# Patient Record
Sex: MALE | Race: BLACK OR AFRICAN AMERICAN | HISPANIC OR LATINO | Employment: STUDENT | URBAN - METROPOLITAN AREA
[De-identification: names, ages, dates, MRNs, and addresses within clinical notes are randomized per-mention and may not be internally consistent; named-entity substitution may affect disease eponyms.]

---
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
## 2019-01-23 DIAGNOSIS — J45.41 MODERATE PERSISTENT ASTHMA WITH EXACERBATION: ICD-10-CM

## 2019-01-23 DIAGNOSIS — L20.82 FLEXURAL ECZEMA: ICD-10-CM

## 2019-01-23 RX ORDER — MINERAL OIL/UREA/PEG/WATER
LOTION (ML) TOPICAL
Qty: 226 G | Refills: 0 | OUTPATIENT
Start: 2019-01-23

## 2019-01-23 NOTE — TELEPHONE ENCOUNTER
Benadryl cream, Eucerin cream, and Benadryl liquid    Last Written Prescription Date:  6-27-17, 1-11-19,  11-10-18  Last Fill Quantity: 300ml, 226g, 28g   # refills: 5, 0, 0  Last Office Visit: 8-21-18  Future Office visit:       Routing refill requests to provider for review/approval because:  Per Pediatric refill protocol.    Please advise, thanks.    (Eucerin cream is a duplicate and verified this with pharmacy.  Med refused.)

## 2019-01-24 RX ORDER — DIPHENHYDRAMINE HCL 12.5 MG/5ML
50 SOLUTION ORAL EVERY 4 HOURS PRN
Qty: 300 ML | Refills: 5 | Status: SHIPPED | OUTPATIENT
Start: 2019-01-24 | End: 2023-03-02

## 2019-01-24 RX ORDER — KRILL/OM-3/DHA/EPA/PHOSPHO/AST 1000-230MG
CAPSULE ORAL
Qty: 28 G | Refills: 0 | Status: SHIPPED | OUTPATIENT
Start: 2019-01-24 | End: 2020-03-30

## 2019-01-26 DIAGNOSIS — L20.82 FLEXURAL ECZEMA: ICD-10-CM

## 2019-01-26 NOTE — TELEPHONE ENCOUNTER
Clobetasol 0.05 % cream      Last Written Prescription Date:  1/11/19  Last Fill Quantity: 30 g,   # refills: 0  Last Office Visit: 8/21/18  Future Office visit:       Routing refill request to provider for review/approval because:  Peds protocol.  Requested Prescriptions   Pending Prescriptions Disp Refills     CLOBETASOL PROPIONATE EMULSION 0.05 % CREA cream [Pharmacy Med Name: CLOBETASOL 0.05% EMOLLIENT CRM 15GM] 30 g 0     Sig: APPLY SPARINGLY TWICE DAILY FOR 1-2 WEEKS AS NEEDED. 1-2 WEEKS OFF PRIOR TO FURTHER USE    There is no refill protocol information for this order        Agnes Prater RN

## 2019-01-28 DIAGNOSIS — L20.82 FLEXURAL ECZEMA: ICD-10-CM

## 2019-01-28 RX ORDER — KRILL/OM-3/DHA/EPA/PHOSPHO/AST 1000-230MG
CAPSULE ORAL
Qty: 28 G | Refills: 0 | OUTPATIENT
Start: 2019-01-28

## 2019-01-29 RX ORDER — CLOBETASOL PROPIONATE 0.5 MG/G
EMULSION TOPICAL
Qty: 45 G | Refills: 0 | Status: SHIPPED | OUTPATIENT
Start: 2019-01-29 | End: 2019-02-25

## 2019-02-25 DIAGNOSIS — L20.82 FLEXURAL ECZEMA: ICD-10-CM

## 2019-02-25 NOTE — TELEPHONE ENCOUNTER
Clobetasol Propionate       Last Written Prescription Date:  1/29/19  Last Fill Quantity: 45 g,   # refills: 0  Last Office Visit: 8/21/18  Future Office visit:       Routing refill request to provider for review/approval because:  Per pediatric protocol

## 2019-03-01 RX ORDER — CLOBETASOL PROPIONATE 0.5 MG/G
EMULSION TOPICAL
Qty: 45 G | Refills: 0 | Status: SHIPPED | OUTPATIENT
Start: 2019-03-01 | End: 2019-03-13

## 2019-03-24 DIAGNOSIS — L20.82 FLEXURAL ECZEMA: ICD-10-CM

## 2019-03-25 NOTE — TELEPHONE ENCOUNTER
DiphenhydrAMINE-zinc acetate (BENADRYL) cream     Last Written Prescription Date:  3/14/19  Last Fill Quantity: 28g,   # refills: 0  Last Office Visit: 12/21/18  Future Office visit:       Routing refill request to provider for review/approval because:  Per pediatric protocol.

## 2019-03-26 RX ORDER — DIPHENHYDRAMINE HYDROCHLORIDE, ZINC ACETATE 2; .1 G/100G; G/100G
CREAM TOPICAL
Qty: 28 G | Refills: 2 | Status: SHIPPED | OUTPATIENT
Start: 2019-03-26 | End: 2019-06-02

## 2019-05-18 DIAGNOSIS — L20.82 FLEXURAL ECZEMA: ICD-10-CM

## 2019-05-18 NOTE — TELEPHONE ENCOUNTER
diphenhydrAMINE-zinc acetate (BENADRYL) 2-0.1 % external cream  Last Written Prescription Date:  3/26/19  Last Fill Quantity: 28 g,   # refills: 2  Last Office Visit: 8/21/18  Future Office visit:       Routing refill request to provider for review/approval because:  Peds protocol  Requested Prescriptions   Pending Prescriptions Disp Refills     diphenhydrAMINE-zinc acetate (BENADRYL) 2-0.1 % external cream [Pharmacy Med Name: ANTI-ITCH CREAM MAX STRENGTH 28GM] 28 g 0     Sig: APPLY EXTERNALLY TO THE AFFECTED AREA THREE TIMES DAILY AS NEEDED FOR ITCHING       There is no refill protocol information for this order

## 2019-05-21 RX ORDER — DIPHENHYDRAMINE HYDROCHLORIDE, ZINC ACETATE 2; .1 G/100G; G/100G
CREAM TOPICAL
Qty: 28 G | Refills: 0 | Status: SHIPPED | OUTPATIENT
Start: 2019-05-21 | End: 2019-06-16

## 2019-06-02 DIAGNOSIS — L20.82 FLEXURAL ECZEMA: ICD-10-CM

## 2019-06-02 NOTE — TELEPHONE ENCOUNTER
Requested Prescriptions   Pending Prescriptions Disp Refills     Emollient (EUCERIN SKIN CALMING) CREA [Pharmacy Med Name: EUCERIN CALMING CREAM 226ML]  Last Written Prescription Date:  7/17/18  Last Fill Quantity: 226g,  # refills: 0   Last office visit: 8/21/2018 with prescribing provider:  Adelaida   Future Office Visit:    226 g 0     Sig: APPLY TOPICALLY AS NEEDED FOR DRY SKIN    There is no refill protocol information for this order      Routing refill request to provider for review/approval because:  Pediatric patient         
none

## 2019-06-04 RX ORDER — KRILL/OM-3/DHA/EPA/PHOSPHO/AST 1000-230MG
CAPSULE ORAL
Qty: 28 G | Refills: 0 | Status: SHIPPED | OUTPATIENT
Start: 2019-06-04 | End: 2020-03-30

## 2019-06-04 NOTE — TELEPHONE ENCOUNTER
Wal-Dryl (BENADRYL) 2-0.1 % external cream     Last Written Prescription Date:  5/12/19  Last Fill Quantity: 28g,   # refills: 0  Last Office Visit: 12/21/18  Future Office visit:       Routing refill request to provider for review/approval because:  Per pediatric protocol.

## 2019-06-16 DIAGNOSIS — L20.82 FLEXURAL ECZEMA: ICD-10-CM

## 2019-06-17 NOTE — TELEPHONE ENCOUNTER
WAL-DRYL 2-0.1 % external cream      Last Written Prescription Date:  6/4/19  Last Fill Quantity: 28g,   # refills: 0  Last Office Visit: 8/21/18  Future Office visit:       Routing refill request to provider for review/approval because:  Per pediatric protocol.

## 2019-06-18 RX ORDER — KRILL/OM-3/DHA/EPA/PHOSPHO/AST 1000-230MG
CAPSULE ORAL
Qty: 28 G | Refills: 0 | Status: SHIPPED | OUTPATIENT
Start: 2019-06-18 | End: 2019-07-06

## 2019-06-24 DIAGNOSIS — J45.50 UNCOMPLICATED SEVERE PERSISTENT ASTHMA (H): ICD-10-CM

## 2019-06-24 NOTE — TELEPHONE ENCOUNTER
"Requested Prescriptions   Pending Prescriptions Disp Refills     ipratropium - albuterol 0.5 mg/2.5 mg/3 mL (DUONEB) 0.5-2.5 (3) MG/3ML neb solution 30 vial 1     Sig: Take 1 vial (3 mLs) by nebulization every 6 hours as needed for shortness of breath / dyspnea or wheezing   Last Written Prescription Date:  10/28/2016  Last Fill Quantity: 30 vial,  # refills: 1   Last office visit: 12/21/2018 with prescribing provider:     Future Office Visit:      Short-Acting Beta Agonist Inhalers Protocol  Failed - 6/24/2019 12:50 PM        Failed - Asthma control assessment score within normal limits in last 6 months     Please review ACT score.           Failed - Recent (6 mo) or future (30 days) visit within the authorizing provider's specialty     Patient had office visit in the last 6 months or has a visit in the next 30 days with authorizing provider or within the authorizing provider's specialty.  See \"Patient Info\" tab in inbasket, or \"Choose Columns\" in Meds & Orders section of the refill encounter.            Passed - Patient is age 12 or older        Passed - Medication is active on med list        "

## 2019-06-25 RX ORDER — IPRATROPIUM BROMIDE AND ALBUTEROL SULFATE 2.5; .5 MG/3ML; MG/3ML
1 SOLUTION RESPIRATORY (INHALATION) EVERY 6 HOURS PRN
Qty: 30 VIAL | Refills: 1 | Status: SHIPPED | OUTPATIENT
Start: 2019-06-25 | End: 2023-03-02

## 2019-06-25 NOTE — TELEPHONE ENCOUNTER
Ipratorpium-albuterol neb  Last Written Prescription Date:  10/28/16  Last Fill Quantity: 30 vials,   # refills: 1  Last Office Visit: 8/21/19  Future Office visit:       Routing refill request to provider for review/approval because:  Per pediatric protocol

## 2019-07-06 DIAGNOSIS — L20.82 FLEXURAL ECZEMA: ICD-10-CM

## 2019-07-06 NOTE — TELEPHONE ENCOUNTER
Wal-dryl cream      Last Written Prescription Date:  6-18-19  Last Fill Quantity: 28g,   # refills: 0  Last Office Visit: 12-21-18  Future Office visit:       Routing refill request to provider for review/approval because:  Per Pediatric refill protocol.    Please advise, thanks.

## 2019-07-10 RX ORDER — KRILL/OM-3/DHA/EPA/PHOSPHO/AST 1000-230MG
CAPSULE ORAL
Qty: 28 G | Refills: 0 | Status: SHIPPED | OUTPATIENT
Start: 2019-07-10 | End: 2020-06-02

## 2019-07-23 ENCOUNTER — TELEPHONE (OUTPATIENT)
Dept: PEDIATRICS | Facility: CLINIC | Age: 19
End: 2019-07-23

## 2019-08-01 ENCOUNTER — TRANSFERRED RECORDS (OUTPATIENT)
Dept: HEALTH INFORMATION MANAGEMENT | Facility: CLINIC | Age: 19
End: 2019-08-01

## 2019-08-01 LAB
ASTHMA CONTROL TEST SCORE: 22
ER ASTHMA: 0
HOSPITALIZATION ASTHMA: 0

## 2019-08-22 NOTE — TELEPHONE ENCOUNTER
Discussed the form further.  Mother stating that he needs his own room due to being able to go there when agitated, get away from others.  Also if does move would have to go through process of checking for mold and mitigation due to his severe asthma. Discussed with mom that would like to get some information from mental health professional indicating benefit to him important to be able to fill out form.  Will hold onto form for time being.

## 2019-09-02 DIAGNOSIS — L20.82 FLEXURAL ECZEMA: ICD-10-CM

## 2019-09-03 RX ORDER — CLOBETASOL PROPIONATE 0.5 MG/G
EMULSION TOPICAL
Qty: 45 G | Refills: 0 | Status: SHIPPED | OUTPATIENT
Start: 2019-09-03 | End: 2019-12-03

## 2019-09-03 NOTE — TELEPHONE ENCOUNTER
Clobetasol cream      Last Written Prescription Date:  3/14/19  Last Fill Quantity: 45 g,   # refills: 0  Last Office Visit: 12/21/18  Future Office visit:       Routing refill request to provider for review/approval because:  Drug not on the [facility], [facility] or [facility] refill protocol or controlled substance  Haley Engle RN

## 2019-10-08 ENCOUNTER — TRANSFERRED RECORDS (OUTPATIENT)
Dept: HEALTH INFORMATION MANAGEMENT | Facility: CLINIC | Age: 19
End: 2019-10-08

## 2019-10-08 LAB
CREAT SERPL-MCNC: 1.08 MG/DL (ref 0.72–1.25)
GFR SERPL CREATININE-BSD FRML MDRD: >60 ML/MIN/1.73M2
GLUCOSE SERPL-MCNC: 96 MG/DL (ref 65–100)
POTASSIUM SERPL-SCNC: 3.8 MMOL/L (ref 3.5–5)

## 2019-10-14 ENCOUNTER — TRANSFERRED RECORDS (OUTPATIENT)
Dept: HEALTH INFORMATION MANAGEMENT | Facility: CLINIC | Age: 19
End: 2019-10-14

## 2019-10-16 ENCOUNTER — PATIENT OUTREACH (OUTPATIENT)
Dept: CARE COORDINATION | Facility: CLINIC | Age: 19
End: 2019-10-16

## 2019-10-16 DIAGNOSIS — Z76.89 HEALTH CARE HOME: Primary | ICD-10-CM

## 2019-10-16 NOTE — PROGRESS NOTES
The clinic Community Health Worker called the patient today at the request of the PCP to discuss possible Clinic Care Coordination enrollment.  The service was described to the patient and immediate needs were discussed.  The patient declined enrollement at this time.  The PCP is encouraged to refer in the future if the patient's needs change.    Spoke to Lauren(mom)

## 2019-12-03 DIAGNOSIS — L20.82 FLEXURAL ECZEMA: ICD-10-CM

## 2019-12-03 RX ORDER — CLOBETASOL PROPIONATE 0.5 MG/G
CREAM TOPICAL
Qty: 45 G | Refills: 0 | Status: SHIPPED | OUTPATIENT
Start: 2019-12-03 | End: 2020-03-30

## 2019-12-03 NOTE — TELEPHONE ENCOUNTER
Requested Prescriptions   Pending Prescriptions Disp Refills     clobetasol (CLOBETASOL PROPIONATE EMULSION) 0.05 % CREA cream 45 g 0       There is no refill protocol information for this order          Last Written Prescription Date:  09/03/19  Last Fill Quantity: 45 g,  # refills: 0   Last office visit: 8/21/2018 with prescribing provider:  12/21/18   Future Office Visit:

## 2019-12-18 NOTE — TELEPHONE ENCOUNTER
I had asked the mother from some documentation from a mental health professional about the need for his own room.   At this point have not heard back from them and will close encounter.

## 2020-02-06 ENCOUNTER — TRANSFERRED RECORDS (OUTPATIENT)
Dept: HEALTH INFORMATION MANAGEMENT | Facility: CLINIC | Age: 20
End: 2020-02-06

## 2020-02-06 LAB
ASTHMA CONTROL TEST SCORE: 16
ER ASTHMA: 1
HOSPITALIZATION ASTHMA: 0

## 2020-03-24 ENCOUNTER — TRANSFERRED RECORDS (OUTPATIENT)
Dept: HEALTH INFORMATION MANAGEMENT | Facility: CLINIC | Age: 20
End: 2020-03-24

## 2020-03-24 LAB
ASTHMA CONTROL TEST SCORE: 19
ER ASTHMA: 0
HOSPITALIZATION ASTHMA: 0

## 2020-03-26 ENCOUNTER — TRANSFERRED RECORDS (OUTPATIENT)
Dept: HEALTH INFORMATION MANAGEMENT | Facility: CLINIC | Age: 20
End: 2020-03-26

## 2020-03-30 ENCOUNTER — VIRTUAL VISIT (OUTPATIENT)
Dept: INTERNAL MEDICINE | Facility: CLINIC | Age: 20
End: 2020-03-30
Payer: COMMERCIAL

## 2020-03-30 DIAGNOSIS — L20.82 FLEXURAL ECZEMA: ICD-10-CM

## 2020-03-30 DIAGNOSIS — L70.0 ACNE VULGARIS: ICD-10-CM

## 2020-03-30 PROCEDURE — 99442 ZZC PHYSICIAN TELEPHONE EVALUATION 11-20 MIN: CPT | Performed by: INTERNAL MEDICINE

## 2020-03-30 RX ORDER — PIMECROLIMUS 10 MG/G
CREAM TOPICAL
Qty: 45 G | Refills: 2 | Status: SHIPPED | OUTPATIENT
Start: 2020-03-30 | End: 2023-03-02

## 2020-03-30 RX ORDER — CLOBETASOL PROPIONATE 0.5 MG/G
CREAM TOPICAL
Qty: 45 G | Refills: 3 | Status: SHIPPED | OUTPATIENT
Start: 2020-03-30 | End: 2023-03-02

## 2020-03-30 NOTE — PROGRESS NOTES
"Subjective     Lázaro Colindres is a 19 year old male who is being evaluated via a billable telephone visit.      The patient has been notified of following:     \"This telephone visit will be conducted via a call between you and your physician/provider. We have found that certain health care needs can be provided without the need for a physical exam.  This service lets us provide the care you need with a short phone conversation.  If a prescription is necessary we can send it directly to your pharmacy.  If lab work is needed we can place an order for that and you can then stop by our lab to have the test done at a later time.    If during the course of the call the physician/provider feels a telephone visit is not appropriate, you will not be charged for this service.\"     Patient has given verbal consent for Telephone visit?  Yes    Lázaro Colindres complains of   Chief Complaint   Patient presents with     Recheck Medication     Telephone visit- F/U on Eczema/refills       ALLERGIES  Peanuts [nuts]; Dog hair [dogs]; Dust mites; Mold; Seasonal allergies; Soy; and Wheat    Patient had a phone visit for refills of his topical medications for acne and eczema.   He has been seen in the pediatric clinic up to now.     Has h/o acne vulgaris. Managed with topical benzoyl peroxide treatment. Reports good control and no significant side effects, no skin dryness. No itching.   Has h/o eczema, uses as needed steroid topical treatment with good effect. Uses skin moisturizers.          PROBLEMS TO ADD ON...  -------------------------------------  Has h/o asthma , seeing specialist. On inhaled bronchodilators and steroids. Controlled .    Patient Active Problem List   Diagnosis     Allergic rhinitis     Obesity     Severe persistent asthma beginning 9/2016 on continuous steroid treatment via Kenalog injections.      Constipation     Generalized abdominal pain     Eosinophilic esophagitis     Tic disorder     Eczema     " Irritable bowel syndrome     Severe persistent asthma without complication in pediatric patient     Flexural eczema     Class 3 obesity due to excess calories with body mass index (BMI) of 45.0 to 49.9 in adult, unspecified whether serious comorbidity present     Past Surgical History:   Procedure Laterality Date     COLONOSCOPY       ESOPHAGOSCOPY, GASTROSCOPY, DUODENOSCOPY (EGD), COMBINED  3/4/2013    Procedure: COMBINED ESOPHAGOSCOPY, GASTROSCOPY, DUODENOSCOPY (EGD), BIOPSY SINGLE OR MULTIPLE;  Upper Endoscopy With Biopsy; Colonoscopy With Biopsy ;  Surgeon: Georgiana York MD;  Location:  OR       Social History     Tobacco Use     Smoking status: Never Smoker     Smokeless tobacco: Never Used     Tobacco comment: No one in family smokes.   Substance Use Topics     Alcohol use: No     Alcohol/week: 0.0 standard drinks     Comment: 04/07/2015 l.n.     Family History   Problem Relation Age of Onset     Asthma Mother      Arthritis Mother      Diabetes Mother      Diabetes Maternal Grandmother      Arthritis Maternal Grandmother      C.A.D. Brother      C.A.D. Sister      Diabetes Father      Diabetes Maternal Grandfather      Asthma Sister         Both sisters have asthma.      Eczema Sister         Sisters and brothers all have eczema.     Allergies Brother         Sisters and brothers all have food allergies.      Diabetes Paternal Grandmother      Diabetes Paternal Uncle          Current Outpatient Medications   Medication Sig Dispense Refill     albuterol (2.5 MG/3ML) 0.083% neb solution Take 1 vial (2.5 mg) by nebulization every 4 hours as needed 2 Box 1     albuterol (PROAIR HFA/PROVENTIL HFA/VENTOLIN HFA) 108 (90 BASE) MCG/ACT Inhaler Inhale 2 puffs into the lungs every 4 hours as needed for shortness of breath / dyspnea or wheezing 2 Inhaler 0     Benzoyl Peroxide 2.5 % CREA Apply BID 1 Tube 5     calcium carbonate-vitamin D 500-400 MG-UNIT TABS per tablet Take 1 tablet by mouth daily 90 tablet 3      cetirizine (ZYRTEC) 10 MG tablet Take 1 tablet (10 mg) by mouth At Bedtime 30 tablet 6     clobetasol (CLOBETASOL PROPIONATE EMULSION) 0.05 % CREA cream APPLY SPARINGLY TWICE DAILY FOR 1-2 WEEKS AS NEEDED. TAKE 1-2 WEEKS OFF PRIOR TO FURTHER USE 45 g 3     diphenhydrAMINE (Q-DRYL) 12.5 MG/5ML liquid Take 20 mLs (50 mg) by mouth every 4 hours as needed for itching or allergies 300 mL 5     Emollient (EUCERIN SKIN CALMING) CREA APPLY TOPICALLY AS NEEDED FOR DRY SKIN 226 g 0     EPINEPHrine (EPIPEN) 0.3 MG/0.3ML injection Inject 0.3 mLs (0.3 mg) into the muscle as needed for anaphylaxis 0.6 mL 3     fluticasone (FLONASE) 50 MCG/ACT spray Spray 2 sprays into both nostrils daily 16 g 5     fluticasone (FLOVENT HFA) 220 MCG/ACT Inhaler Inhale 2 puffs into the lungs 2 times daily Per asthma action plan 1 Inhaler 12     ipratropium - albuterol 0.5 mg/2.5 mg/3 mL (DUONEB) 0.5-2.5 (3) MG/3ML neb solution Take 1 vial (3 mLs) by nebulization every 6 hours as needed for shortness of breath / dyspnea or wheezing 30 vial 1     Multiple Vitamins-Minerals (CENTROVITE) TABS TAKE 1 TABLET BY MOUTH DAILY 100 tablet 0     olopatadine (PATANOL) 0.1 % ophthalmic solution 1 gtt each eye BID prn 5 mL 12     pimecrolimus (ELIDEL) 1 % external cream Apply 1-2 times per day for several weeks prn to face eczema. 45 g 2     prednisoLONE (ORAPRED) 15 mg/5 mL solution Give 15 mL PO BID x 6 days. 180 mL 0     psyllium (METAMUCIL/KONSYL) 58.6 % powder Take 18 g (1 Tablespoonful) by mouth daily 283 g 3     Triamcinolone Acetonide (KENALOG IJ) Reported on 3/6/2017       umeclidinium-vilanterol (ANORO ELLIPTA) 62.5-25 MCG/INH oral inhaler Inhale 2 Doses into the lungs 2 times daily       WAL-DRYL 2-0.1 % external cream APPLY EXTERNALLY TO THE AFFECTED AREA THREE TIMES DAILY AS NEEDED FOR ITCHING 28 g 0     omalizumab (XOLAIR) 150 MG injection Inject Subcutaneous every 28 days       order for DME Equipment being ordered: Nebulizer tubing x 2 and  adult mask x 2 2 each 0       Reviewed and updated as needed this visit by Provider         Review of Systems   ROS COMP: Constitutional, HEENT, cardiovascular, pulmonary, gi and gu systems are negative, except as otherwise noted.         Diagnostic Test Results:  Labs reviewed in Epic        Assessment/Plan:  1. Flexural eczema    - clobetasol (CLOBETASOL PROPIONATE EMULSION) 0.05 % CREA cream; APPLY SPARINGLY TWICE DAILY FOR 1-2 WEEKS AS NEEDED. TAKE 1-2 WEEKS OFF PRIOR TO FURTHER USE  Dispense: 45 g; Refill: 3  - pimecrolimus (ELIDEL) 1 % external cream; Apply 1-2 times per day for several weeks prn to face eczema.  Dispense: 45 g; Refill: 2    2. Acne vulgaris    - Benzoyl Peroxide 2.5 % CREA; Apply BID  Dispense: 1 Tube; Refill: 5    No follow-ups on file.      Phone call duration:  12 minutes    Dylan Hammer MD

## 2020-03-30 NOTE — TELEPHONE ENCOUNTER
Clobetasol cream      Last Written Prescription Date:  12-3-19  Last Fill Quantity: 45g,   # refills: 0  Last Office Visit: 8-21-18  Future Office visit:       Routing refill request to provider for review/approval because:  Per Pediatric refill protocol.    Schedule telephone visit?  If so, with IM/family practice or Peds?    Please advise, thanks.

## 2020-03-30 NOTE — PROGRESS NOTES
"Subjective     Lázaro Colindres is a 19 year old male who is being evaluated via a billable telephone visit.      The patient has been notified of following:     \"This telephone visit will be conducted via a call between you and your physician/provider. We have found that certain health care needs can be provided without the need for a physical exam.  This service lets us provide the care you need with a short phone conversation.  If a prescription is necessary we can send it directly to your pharmacy.  If lab work is needed we can place an order for that and you can then stop by our lab to have the test done at a later time.    If during the course of the call the physician/provider feels a telephone visit is not appropriate, you will not be charged for this service.\"     Patient has given verbal consent for Telephone visit?  Yes    Lázaro Colindres complains of   Chief Complaint   Patient presents with     Recheck Medication     Telephone visit- Requesting Clobetasol refill       ALLERGIES  Peanuts [nuts]; Dog hair [dogs]; Dust mites; Mold; Seasonal allergies; Soy; and Wheat      {PEDS Chronic and Acute Problems (Optional):314957}     {additonal problems for provider to add (Optional):392722}    {HIST REVIEW/ LINKS 2 (Optional):549873}    Reviewed and updated as needed this visit by Provider         Review of Systems   {ROS COMP (Optional):331015}       Objective   Reported vitals:  There were no vitals taken for this visit.   {GENERAL APPEARANCE:50::\"healthy\",\"alert\",\"no distress\"}  Psych: Alert and oriented times 3; coherent speech, normal   rate and volume, able to articulate logical thoughts, able   to abstract reason, no tangential thoughts, no hallucinations   or delusions  His affect is ***     {Diagnostic Test Results (Optional):400780::\"Diagnostic Test Results:\",\"Labs reviewed in Epic\"}        Assessment/Plan:  {Diagnosis, Associated Orders and Comment:609852}    No follow-ups on file.      Phone call " duration:  *** minutes    {signature options:232553}

## 2020-03-31 ENCOUNTER — TELEPHONE (OUTPATIENT)
Dept: PEDIATRICS | Facility: CLINIC | Age: 20
End: 2020-03-31

## 2020-03-31 DIAGNOSIS — L30.9 ECZEMA, UNSPECIFIED TYPE: Primary | ICD-10-CM

## 2020-03-31 RX ORDER — CLOBETASOL PROPIONATE 0.5 MG/G
CREAM TOPICAL
Qty: 45 G | Refills: 0 | OUTPATIENT
Start: 2020-03-31

## 2020-03-31 NOTE — TELEPHONE ENCOUNTER
Fax received from [employer] stating that the pimecrolimus (ELIDEL) 1 % external cream is not covered under patient plan.  Preferred alternative is Elidel or Protopic.  Please advise.

## 2020-04-20 DIAGNOSIS — E55.9 VITAMIN D DEFICIENCY: ICD-10-CM

## 2020-04-20 NOTE — TELEPHONE ENCOUNTER
Vitamin D      Last Written Prescription Date:  ?  Last Fill Quantity: ?,   # refills: ?  Last Office Visit: 8/21/18  Future Office visit:       Routing refill request to provider for review/approval because:  Drug not active on patient's medication list    This was from back in 2017.  Please advise, thanks.

## 2020-04-21 RX ORDER — CHOLECALCIFEROL (VITAMIN D3) 1250 MCG
CAPSULE ORAL
Qty: 12 CAPSULE | Refills: 0 | Status: SHIPPED | OUTPATIENT
Start: 2020-04-21 | End: 2023-03-02

## 2020-04-21 NOTE — TELEPHONE ENCOUNTER
Chart - last rx couple years ago.  Had Vitamin D 13 2018.  Will give rx.    Please notify of rx.  Please also notify that he as he is 19 years old, will need to move up to IM or FP as I don't prescribe or see people once they are 19 (made exception today due to Covid 19 issues).    He would need to have a lab follow up in few months before considering further refills, so that would be good time around July-August for him to establish with IM.

## 2020-04-23 ENCOUNTER — TRANSFERRED RECORDS (OUTPATIENT)
Dept: HEALTH INFORMATION MANAGEMENT | Facility: CLINIC | Age: 20
End: 2020-04-23

## 2020-05-29 RX ORDER — PIMECROLIMUS 10 MG/G
CREAM TOPICAL 2 TIMES DAILY
Qty: 60 G | Refills: 1 | Status: SHIPPED | OUTPATIENT
Start: 2020-05-29

## 2020-05-31 DIAGNOSIS — L20.82 FLEXURAL ECZEMA: ICD-10-CM

## 2020-06-01 NOTE — TELEPHONE ENCOUNTER
wal-dryl cream      Last Written Prescription Date:  7/10/19  Last Fill Quantity: 28 g,   # refills: 0  Last Office Visit: 8/21/18 with Adelaida.  Patient saw Dr. Hammer in IM on 12/21/18  Future Office visit:       Routing refill request to provider for review/approval because:  Pediatric protocol

## 2020-06-01 NOTE — TELEPHONE ENCOUNTER
I do not do medications for 19 years old.  Does look like he has seen an IM provider for at least one visit.  Please check with parent to see if they have selected an IM provider to see him that they can specify as pcp and redirect the prescription refill to them.

## 2020-06-02 RX ORDER — KRILL/OM-3/DHA/EPA/PHOSPHO/AST 1000-230MG
CAPSULE ORAL
Qty: 28 G | Refills: 0 | Status: SHIPPED | OUTPATIENT
Start: 2020-06-02 | End: 2023-03-02

## 2020-06-13 DIAGNOSIS — L20.82 FLEXURAL ECZEMA: ICD-10-CM

## 2020-06-15 ENCOUNTER — TRANSFERRED RECORDS (OUTPATIENT)
Dept: HEALTH INFORMATION MANAGEMENT | Facility: CLINIC | Age: 20
End: 2020-06-15

## 2020-06-16 RX ORDER — CLOBETASOL PROPIONATE 0.5 MG/G
CREAM TOPICAL
Qty: 45 G | Refills: 3 | OUTPATIENT
Start: 2020-06-16

## 2020-07-27 ENCOUNTER — TRANSFERRED RECORDS (OUTPATIENT)
Dept: HEALTH INFORMATION MANAGEMENT | Facility: CLINIC | Age: 20
End: 2020-07-27

## 2020-08-20 ENCOUNTER — TRANSFERRED RECORDS (OUTPATIENT)
Dept: HEALTH INFORMATION MANAGEMENT | Facility: CLINIC | Age: 20
End: 2020-08-20

## 2020-10-19 ENCOUNTER — TRANSFERRED RECORDS (OUTPATIENT)
Dept: HEALTH INFORMATION MANAGEMENT | Facility: CLINIC | Age: 20
End: 2020-10-19

## 2021-01-14 ENCOUNTER — TRANSFERRED RECORDS (OUTPATIENT)
Dept: HEALTH INFORMATION MANAGEMENT | Facility: CLINIC | Age: 21
End: 2021-01-14

## 2021-02-02 DIAGNOSIS — L70.0 ACNE VULGARIS: ICD-10-CM

## 2021-04-13 ENCOUNTER — TRANSFERRED RECORDS (OUTPATIENT)
Dept: HEALTH INFORMATION MANAGEMENT | Facility: CLINIC | Age: 21
End: 2021-04-13

## 2021-04-19 ENCOUNTER — TRANSFERRED RECORDS (OUTPATIENT)
Dept: HEALTH INFORMATION MANAGEMENT | Facility: CLINIC | Age: 21
End: 2021-04-19

## 2021-07-12 ENCOUNTER — TRANSFERRED RECORDS (OUTPATIENT)
Dept: HEALTH INFORMATION MANAGEMENT | Facility: CLINIC | Age: 21
End: 2021-07-12

## 2021-12-14 ENCOUNTER — TRANSFERRED RECORDS (OUTPATIENT)
Dept: HEALTH INFORMATION MANAGEMENT | Facility: CLINIC | Age: 21
End: 2021-12-14
Payer: COMMERCIAL

## 2022-06-22 NOTE — TELEPHONE ENCOUNTER
Order placed.  Please facilitate.   Patient contacted, he states that his blood pressure was 128/81 this am, he started the increased dose this weekend, he will be completing lab work through his [place] wellness visit soon and will get us copies

## 2023-03-02 ENCOUNTER — OFFICE VISIT (OUTPATIENT)
Dept: FAMILY MEDICINE | Facility: CLINIC | Age: 23
End: 2023-03-02
Payer: COMMERCIAL

## 2023-03-02 VITALS
SYSTOLIC BLOOD PRESSURE: 122 MMHG | OXYGEN SATURATION: 96 % | RESPIRATION RATE: 20 BRPM | HEIGHT: 72 IN | HEART RATE: 90 BPM | TEMPERATURE: 98.5 F | BODY MASS INDEX: 42.66 KG/M2 | WEIGHT: 315 LBS | DIASTOLIC BLOOD PRESSURE: 78 MMHG

## 2023-03-02 DIAGNOSIS — J45.41 MODERATE PERSISTENT ASTHMA WITH EXACERBATION: ICD-10-CM

## 2023-03-02 DIAGNOSIS — J30.1 ACUTE SEASONAL ALLERGIC RHINITIS DUE TO POLLEN: ICD-10-CM

## 2023-03-02 DIAGNOSIS — E66.01 MORBID OBESITY (H): ICD-10-CM

## 2023-03-02 DIAGNOSIS — L30.9 ECZEMA, UNSPECIFIED TYPE: Primary | ICD-10-CM

## 2023-03-02 DIAGNOSIS — Z11.4 SCREENING FOR HIV (HUMAN IMMUNODEFICIENCY VIRUS): ICD-10-CM

## 2023-03-02 PROCEDURE — 99204 OFFICE O/P NEW MOD 45 MIN: CPT | Performed by: FAMILY MEDICINE

## 2023-03-02 RX ORDER — DIPHENHYDRAMINE HCL 12.5 MG/5ML
50 SOLUTION ORAL EVERY 4 HOURS PRN
Qty: 300 ML | Refills: 5 | Status: CANCELLED | OUTPATIENT
Start: 2023-03-02

## 2023-03-02 RX ORDER — ALBUTEROL SULFATE 90 UG/1
2 AEROSOL, METERED RESPIRATORY (INHALATION) EVERY 4 HOURS PRN
Qty: 8.5 G | Refills: 11 | Status: SHIPPED | OUTPATIENT
Start: 2023-03-02

## 2023-03-02 RX ORDER — PIMECROLIMUS 10 MG/G
CREAM TOPICAL
Qty: 45 G | Refills: 2 | Status: CANCELLED | OUTPATIENT
Start: 2023-03-02

## 2023-03-02 RX ORDER — PREDNISOLONE SODIUM PHOSPHATE 15 MG/5ML
SOLUTION ORAL
Qty: 180 ML | Refills: 0 | Status: CANCELLED | OUTPATIENT
Start: 2023-03-02

## 2023-03-02 RX ORDER — CLOBETASOL PROPIONATE 0.5 MG/G
CREAM TOPICAL
Qty: 45 G | Refills: 3 | Status: CANCELLED | OUTPATIENT
Start: 2023-03-02

## 2023-03-02 RX ORDER — DIPHENHYDRAMINE HYDROCHLORIDE, ZINC ACETATE 2; .1 G/100G; G/100G
CREAM TOPICAL
Qty: 28 G | Refills: 0 | Status: CANCELLED | OUTPATIENT
Start: 2023-03-02

## 2023-03-02 RX ORDER — FLUTICASONE PROPIONATE 50 MCG
2 SPRAY, SUSPENSION (ML) NASAL DAILY
Qty: 16 G | Refills: 5 | Status: CANCELLED | OUTPATIENT
Start: 2023-03-02

## 2023-03-02 RX ORDER — FLUTICASONE PROPIONATE 220 UG/1
2 AEROSOL, METERED RESPIRATORY (INHALATION) 2 TIMES DAILY
Qty: 12 G | Refills: 11 | Status: SHIPPED | OUTPATIENT
Start: 2023-03-02

## 2023-03-02 RX ORDER — CETIRIZINE HYDROCHLORIDE 10 MG/1
10 TABLET ORAL AT BEDTIME
Qty: 30 TABLET | Refills: 6 | Status: CANCELLED | OUTPATIENT
Start: 2023-03-02

## 2023-03-02 RX ORDER — EPINEPHRINE 0.3 MG/.3ML
0.3 INJECTION SUBCUTANEOUS PRN
Qty: 0.6 ML | Refills: 3 | Status: CANCELLED | OUTPATIENT
Start: 2023-03-02

## 2023-03-02 RX ORDER — PIMECROLIMUS 10 MG/G
CREAM TOPICAL 2 TIMES DAILY
Qty: 60 G | Refills: 1 | Status: CANCELLED | OUTPATIENT
Start: 2023-03-02

## 2023-03-02 RX ORDER — ALBUTEROL SULFATE 0.83 MG/ML
2.5 SOLUTION RESPIRATORY (INHALATION) EVERY 4 HOURS PRN
Qty: 100 ML | Refills: 3 | Status: SHIPPED | OUTPATIENT
Start: 2023-03-02

## 2023-03-02 RX ORDER — IPRATROPIUM BROMIDE AND ALBUTEROL SULFATE 2.5; .5 MG/3ML; MG/3ML
1 SOLUTION RESPIRATORY (INHALATION) EVERY 6 HOURS PRN
Qty: 30 ML | Refills: 3 | Status: SHIPPED | OUTPATIENT
Start: 2023-03-02

## 2023-03-02 RX ORDER — PREDNISONE 20 MG/1
40 TABLET ORAL DAILY
Qty: 10 TABLET | Refills: 3 | Status: SHIPPED | OUTPATIENT
Start: 2023-03-02 | End: 2023-07-01

## 2023-03-02 RX ORDER — OLOPATADINE HYDROCHLORIDE 1 MG/ML
SOLUTION/ DROPS OPHTHALMIC
Qty: 5 ML | Refills: 12 | Status: CANCELLED | OUTPATIENT
Start: 2023-03-02

## 2023-03-02 RX ORDER — MINERAL OIL/UREA/PEG/WATER
LOTION (ML) TOPICAL PRN
Qty: 226 G | Refills: 0 | Status: CANCELLED | OUTPATIENT
Start: 2023-03-02

## 2023-03-02 RX ORDER — FLUOXETINE 40 MG/1
40 CAPSULE ORAL DAILY
COMMUNITY
Start: 2023-01-24

## 2023-03-02 ASSESSMENT — ASTHMA QUESTIONNAIRES
ACT_TOTALSCORE: 9
QUESTION_5 LAST FOUR WEEKS HOW WOULD YOU RATE YOUR ASTHMA CONTROL: POORLY CONTROLLED
QUESTION_3 LAST FOUR WEEKS HOW OFTEN DID YOUR ASTHMA SYMPTOMS (WHEEZING, COUGHING, SHORTNESS OF BREATH, CHEST TIGHTNESS OR PAIN) WAKE YOU UP AT NIGHT OR EARLIER THAN USUAL IN THE MORNING: TWO OR THREE NIGHTS A WEEK
ACT_TOTALSCORE: 9
QUESTION_1 LAST FOUR WEEKS HOW MUCH OF THE TIME DID YOUR ASTHMA KEEP YOU FROM GETTING AS MUCH DONE AT WORK, SCHOOL OR AT HOME: SOME OF THE TIME
QUESTION_4 LAST FOUR WEEKS HOW OFTEN HAVE YOU USED YOUR RESCUE INHALER OR NEBULIZER MEDICATION (SUCH AS ALBUTEROL): THREE OR MORE TIMES PER DAY
QUESTION_2 LAST FOUR WEEKS HOW OFTEN HAVE YOU HAD SHORTNESS OF BREATH: MORE THAN ONCE A DAY

## 2023-03-02 ASSESSMENT — PAIN SCALES - GENERAL: PAINLEVEL: SEVERE PAIN (7)

## 2023-03-02 NOTE — PROGRESS NOTES
Assessment & Plan     Screening for HIV (human immunodeficiency virus)      Moderate persistent asthma with exacerbation  Has moderate persistent asthma with mild exacerbation.  We discussed about treatment options including nebulizer treatment which was given to him to take home.  He should use albuterol neb every 2 hours and if his symptoms does not improve, can change that to every 1 hour and if still not improved then I would suggest him to go to the ER for evaluation.  We will also start him on some prednisone 40 mg daily for the next 5 days.  I also sent some DuoNeb and albuterol and Flovent for overall better care.  I suggested he should see an asthma specialist for continuing care.  Patient told me he has neb machine at home.  - albuterol (PROVENTIL) (2.5 MG/3ML) 0.083% neb solution; Take 1 vial (2.5 mg) by nebulization every 4 hours as needed for shortness of breath or wheezing  - albuterol (PROAIR HFA/PROVENTIL HFA/VENTOLIN HFA) 108 (90 Base) MCG/ACT inhaler; Inhale 2 puffs into the lungs every 4 hours as needed for shortness of breath or wheezing  - ipratropium - albuterol 0.5 mg/2.5 mg/3 mL (DUONEB) 0.5-2.5 (3) MG/3ML neb solution; Take 1 vial (3 mLs) by nebulization every 6 hours as needed for shortness of breath or wheezing  - fluticasone (FLOVENT HFA) 220 MCG/ACT inhaler; Inhale 2 puffs into the lungs 2 times daily Per asthma action plan  - predniSONE (DELTASONE) 20 MG tablet; Take 2 tablets (40 mg) by mouth daily  - Adult Allergy/Asthma Referral; Future    Acute seasonal allergic rhinitis due to pollen    - fluticasone (FLOVENT HFA) 220 MCG/ACT inhaler; Inhale 2 puffs into the lungs 2 times daily Per asthma action plan    Eczema, unspecified type  Suggested to see dermatology or asthma's allergy specialist.  - Adult Dermatology Referral; Future  - Adult Allergy/Asthma Referral; Future        More than 45 min spent with the patient >half time spent in counseling and coordinating care, discusses  "diagnose and treatment plan.    56}     BMI:   Estimated body mass index is 45.04 kg/m  as calculated from the following:    Height as of this encounter: 1.823 m (5' 11.77\").    Weight as of this encounter: 149.7 kg (330 lb).     Carmine Watson MD  [facility] [place] is a 22 year old presenting for the following health issues:  Asthma (Follow-up)    Patient is new to the clinic however reviewing his past medical history he has history of moderate to severe asthma with some exacerbation.  Lately he has been out of his medication only using albuterol sparingly.  However he has been having some shortness of breath some wheezing which is not well controlled.  He also have some skin issues including eczema.  He has not taken those medication recently.  Patient feels slight wheezing and noticed some difficulty in breathing.  He has nebulizer machine at home but he is out of his supplies.  He is wondering if he can get those.  History of Present Illness     Asthma:  He presents for follow up of asthma.  He has some cough, some wheezing, and some shortness of breath. He is using a relief medication every 4 hours. He does not miss any doses of his controller medication throughout the week.Patient is aware of the following triggers: cold air, exercise or sports and smoke. The patient has not had a visit to the Emergency Room, Urgent Care or Hospital due to asthma since the last clinic visit.     He eats 0-1 servings of fruits and vegetables daily.He consumes 0 sweetened beverage(s) daily.He exercises with enough effort to increase his heart rate 30 to 60 minutes per day.  He exercises with enough effort to increase his heart rate 3 or less days per week.   He is taking medications regularly.           Review of Systems   Constitutional, HEENT, cardiovascular, pulmonary, gi and gu systems are negative, except as otherwise noted.      Objective    /78   Pulse 90   Temp 98.5  F " "(36.9  C) (Tympanic)   Resp 20   Ht 1.823 m (5' 11.77\")   Wt 149.7 kg (330 lb)   SpO2 96%   BMI 45.04 kg/m    Body mass index is 45.04 kg/m .  Physical Exam   GENERAL: healthy, alert and no distress  NECK: no adenopathy, no asymmetry, masses, or scars and thyroid normal to palpation  RESP: Expiratory wheezing noted bilaterally.  CV: regular rate and rhythm, normal S1 S2, no S3 or S4, no murmur, click or rub, no peripheral edema and peripheral pulses strong  ABDOMEN: soft, nontender, no hepatosplenomegaly, no masses and bowel sounds normal  MS: no gross musculoskeletal defects noted, no edema            "

## 2023-04-15 ENCOUNTER — HEALTH MAINTENANCE LETTER (OUTPATIENT)
Age: 23
End: 2023-04-15

## 2023-06-21 ENCOUNTER — OFFICE VISIT (OUTPATIENT)
Dept: FAMILY MEDICINE | Facility: CLINIC | Age: 23
End: 2023-06-21
Attending: FAMILY MEDICINE
Payer: COMMERCIAL

## 2023-06-21 DIAGNOSIS — L72.0 EPIDERMAL CYST: Primary | ICD-10-CM

## 2023-06-21 PROCEDURE — 99202 OFFICE O/P NEW SF 15 MIN: CPT | Performed by: NURSE PRACTITIONER

## 2023-06-21 ASSESSMENT — PAIN SCALES - GENERAL: PAINLEVEL: NO PAIN (0)

## 2023-06-21 NOTE — LETTER
"    6/21/2023         RE: Lázaro Colindres  [address] [id number] [phone]  [place] [address]        Dear Colleague,    Thank you for referring your patient, Lázaro Colindres, to the [facility] [place]. Please see a copy of my visit note below.    [facility] Dermatology Note  Encounter Date: Jun 21, 2023  Office Visit     Reviewed patients past medical history and pertinent chart review prior to patients visit today.     Dermatology Problem List:  Epidermal cyst, right low abdomen and mons pubis    ____________________________________________    Assessment & Plan:     # Cyst. Benign, no further treatment needed. Discussed excision if patient is bothered by the lesion due to irritation. Patient prefers to have the lesion removed and is aware of the risk of infection, scar, incomplete removal and recurrence. Patient scheduled follow up excision for removal at their convenience at the end of appointment today. We will first excise lesion on low abdomen and consider mons pubis if it is more obvious at follow up.       Teresa Lambert, CNP  Dermatology    _______________________________________    CC: Derm Problem (Painful fluid filled bumps under belly, which are painful at times)    HPI:  Mr. Lázaro Colindres is a(n) 22 year old male who presents today as a new patient for \"boils\" under abdominal fold. At times they are painful and drain a large amount of white fluid. He also has one on his mons pubis that is small and newer but also has drained    Patient is otherwise feeling well, without additional skin concerns.      Physical Exam:  SKIN: Focused examination of abdomen and mons pubis was performed.  - right low abdomen, 3 cm firm moveable nodule with central punctum  - mons pubis, a few areas of scar like hyperpigmentation, no obvious subcutaneous nodules today    - No other lesions of concern on areas examined.     Medications:  Current Outpatient Medications   Medication     " albuterol (PROAIR HFA/PROVENTIL HFA/VENTOLIN HFA) 108 (90 Base) MCG/ACT inhaler     albuterol (PROVENTIL) (2.5 MG/3ML) 0.083% neb solution     Benzoyl Peroxide 2.5 % CREA     cetirizine (ZYRTEC) 10 MG tablet     Emollient (EUCERIN SKIN CALMING) CREA     EPINEPHrine (EPIPEN) 0.3 MG/0.3ML injection     FLUoxetine (PROZAC) 40 MG capsule     fluticasone (FLONASE) 50 MCG/ACT spray     fluticasone (FLOVENT HFA) 220 MCG/ACT inhaler     ipratropium - albuterol 0.5 mg/2.5 mg/3 mL (DUONEB) 0.5-2.5 (3) MG/3ML neb solution     Multiple Vitamins-Minerals (CENTROVITE) TABS     olopatadine (PATANOL) 0.1 % ophthalmic solution     omalizumab (XOLAIR) 150 MG injection     order for DME     pimecrolimus (ELIDEL) 1 % external cream     predniSONE (DELTASONE) 20 MG tablet     umeclidinium-vilanterol (ANORO ELLIPTA) 62.5-25 MCG/INH oral inhaler     No current facility-administered medications for this visit.      Past Medical History:   Patient Active Problem List   Diagnosis     Allergic rhinitis     Obesity     Severe persistent asthma beginning 9/2016 on continuous steroid treatment via Kenalog injections.      Constipation     Generalized abdominal pain     Eosinophilic esophagitis     Tic disorder     Eczema     Irritable bowel syndrome     Severe persistent asthma without complication in pediatric patient     Flexural eczema     Class 3 obesity due to excess calories with body mass index (BMI) of 45.0 to 49.9 in adult, unspecified whether serious comorbidity present     Morbid obesity (H)     Past Medical History:   Diagnosis Date     Abdominal pain      Asthma      Asthma      Constipation      Eczema      Environmental allergies      Eosinophilic esophagitis      Obese      Seizures (H)     seizure-like movements       CC Carmine Watson MD  [address] [place]  [place] [address] on close of this encounter.       Again, thank you for allowing me to participate in the care of your patient.        Sincerely,        Velia TOMLINSON  YUSUF Lambert CNP

## 2023-06-21 NOTE — PATIENT INSTRUCTIONS
Patient Education       Proper skin care from [facility]:    -Eliminate harsh soaps as they strip the natural oils from the skin, often resulting in dry itchy skin ( i.e. Dial, Zest, Vietnamese Spring)  -Use mild soaps such as Cetaphil or Dove Sensitive Skin in the shower. You do not need to use soap on arms, legs, and trunk every time you shower unless visibly soiled.   -Avoid hot or cold showers.  -After showering, lightly dry off and apply moisturizing within 2-3 minutes. This will help trap moisture in the skin.   -Aggressive use of a moisturizer at least 1-2 times a day to the entire body (including -Vanicream, Cetaphil, Aquaphor or Cerave) and moisturize hands after every washing.  -We recommend using moisturizers that come in a tub that needs to be scooped out, not a pump. This has more of an oil base. It will hold moisture in your skin much better than a water base moisturizer. The above recommended are non-pore clogging.      Wear a sunscreen with at least SPF 30 on your face, ears, neck and V of the chest daily. Wear sunscreen on other areas of the body if those areas are exposed to the sun throughout the day. Sunscreens can contain physical and/or chemical blockers. Physical blockers are less likely to clog pores, these include zinc oxide and titanium dioxide. Reapply every two hour and after swimming.     Sunscreen examples: https://www.ewg.org/sunscreen/    UV radiation  UVA radiation remains constant throughout the day and throughout the year. It is a longer wavelength than UVB and therefore penetrates deeper into the skin leading to immediate and delayed tanning, photoaging, and skin cancer. 70-80% of UVA and UVB radiation occurs between the hours of 10am-2pm.  UVB radiation  UVB radiation causes the most harmful effects and is more significant during the summer months. However, snow and ice can reflect UVB radiation leading to skin damage during the winter months as well. UVB radiation is  responsible for tanning, burning, inflammation, delayed erythema (pinkness), pigmentation (brown spots), and skin cancer.     I recommend self monthly full body exams and yearly full body exams with a dermatology provider. If you develop a new or changing lesion please follow up for examination. Most skin cancers are pink and scaly or pink and pearly. However, we do see blue/brown/black skin cancers.  Consider the ABCDEs of melanoma when giving yourself your monthly full body exam ( don't forget the groin, buttocks, feet, toes, etc). A-asymmetry, B-borders, C-color, D-diameter, E-elevation or evolving. If you see any of these changes please follow up in clinic. If you cannot see your back I recommend purchasing a hand held mirror to use with a larger wall mirror.       Checking for Skin Cancer  You can find cancer early by checking your skin each month. There are 3 kinds of skin cancer. They are melanoma, basal cell carcinoma, and squamous cell carcinoma. Doing monthly skin checks is the best way to find new marks or skin changes. Follow the instructions below for checking your skin.   The ABCDEs of checking moles for melanoma   Check your moles or growths for signs of melanoma using ABCDE:   Asymmetry: the sides of the mole or growth don t match  Border: the edges are ragged, notched, or blurred  Color: the color within the mole or growth varies  Diameter: the mole or growth is larger than 6 mm (size of a pencil eraser)  Evolving: the size, shape, or color of the mole or growth is changing (evolving is not shown in the images below)    Checking for other types of skin cancer  Basal cell carcinoma or squamous cell carcinoma have symptoms such as:     A spot or mole that looks different from all other marks on your skin  Changes in how an area feels, such as itching, tenderness, or pain  Changes in the skin's surface, such as oozing, bleeding, or scaliness  A sore that does not heal  New swelling or redness beyond  the border of a mole    Who s at risk?  Anyone can get skin cancer. But you are at greater risk if you have:   Fair skin, light-colored hair, or light-colored eyes  Many moles or abnormal moles on your skin  A history of sunburns from sunlight or tanning beds  A family history of skin cancer  A history of exposure to radiation or chemicals  A weakened immune system  If you have had skin cancer in the past, you are at risk for recurring skin cancer.   How to check your skin  Do your monthly skin checkups in front of a full-length mirror. Check all parts of your body, including your:   Head (ears, face, neck, and scalp)  Torso (front, back, and sides)  Arms (tops, undersides, upper, and lower armpits)  Hands (palms, backs, and fingers, including under the nails)  Buttocks and genitals  Legs (front, back, and sides)  Feet (tops, soles, toes, including under the nails, and between toes)  If you have a lot of moles, take digital photos of them each month. Make sure to take photos both up close and from a distance. These can help you see if any moles change over time.   Most skin changes are not cancer. But if you see any changes in your skin, call your doctor right away. Only he or she can diagnose a problem. If you have skin cancer, seeing your doctor can be the first step toward getting the treatment that could save your life.   [employer] last reviewed this educational content on 4/1/2019 2000-2020 [employer]. [address] [place] [address], [place] [address]. All rights reserved. This information is not intended as a substitute for professional medical care. Always follow your healthcare professional's instructions.       When should I call my doctor?  If you are worsening or not improving, please, contact us or seek urgent care as noted below.     Who should I call with questions (adults)?  [facility] (adult and pediatric): [phone]  [facility]  [place] (adult): [phone]  [facility] ([place], [place], [place] and [place]) [phone]  For urgent needs outside of business hours call the [facility] at [phone] and ask for the dermatology resident on call to be paged  If this is a medical emergency and you are unable to reach an ER, Call 911      If you need a prescription refill, please contact your pharmacy. Refills are approved or denied by our Physicians during normal business hours, Monday through Fridays  Per office policy, refills will not be granted if you have not been seen within the past year (or sooner depending on your child's condition)

## 2023-06-21 NOTE — PROGRESS NOTES
"[facility] Dermatology Note  Encounter Date: Jun 21, 2023  Office Visit     Reviewed patients past medical history and pertinent chart review prior to patients visit today.     Dermatology Problem List:  Epidermal cyst, right low abdomen and mons pubis    ____________________________________________    Assessment & Plan:     # Cyst. Benign, no further treatment needed. Discussed excision if patient is bothered by the lesion due to irritation. Patient prefers to have the lesion removed and is aware of the risk of infection, scar, incomplete removal and recurrence. Patient scheduled follow up excision for removal at their convenience at the end of appointment today. We will first excise lesion on low abdomen and consider mons pubis if it is more obvious at follow up.       Teresa Lambert, CNP  Dermatology    _______________________________________    CC: Derm Problem (Painful fluid filled bumps under belly, which are painful at times)    HPI:  Mr. Lázaro Colindres is a(n) 22 year old male who presents today as a new patient for \"boils\" under abdominal fold. At times they are painful and drain a large amount of white fluid. He also has one on his mons pubis that is small and newer but also has drained    Patient is otherwise feeling well, without additional skin concerns.      Physical Exam:  SKIN: Focused examination of abdomen and mons pubis was performed.  - right low abdomen, 3 cm firm moveable nodule with central punctum  - mons pubis, a few areas of scar like hyperpigmentation, no obvious subcutaneous nodules today    - No other lesions of concern on areas examined.     Medications:  Current Outpatient Medications   Medication     albuterol (PROAIR HFA/PROVENTIL HFA/VENTOLIN HFA) 108 (90 Base) MCG/ACT inhaler     albuterol (PROVENTIL) (2.5 MG/3ML) 0.083% neb solution     Benzoyl Peroxide 2.5 % CREA     cetirizine (ZYRTEC) 10 MG tablet     Emollient (EUCERIN SKIN CALMING) CREA     EPINEPHrine " (EPIPEN) 0.3 MG/0.3ML injection     FLUoxetine (PROZAC) 40 MG capsule     fluticasone (FLONASE) 50 MCG/ACT spray     fluticasone (FLOVENT HFA) 220 MCG/ACT inhaler     ipratropium - albuterol 0.5 mg/2.5 mg/3 mL (DUONEB) 0.5-2.5 (3) MG/3ML neb solution     Multiple Vitamins-Minerals (CENTROVITE) TABS     olopatadine (PATANOL) 0.1 % ophthalmic solution     omalizumab (XOLAIR) 150 MG injection     order for DME     pimecrolimus (ELIDEL) 1 % external cream     predniSONE (DELTASONE) 20 MG tablet     umeclidinium-vilanterol (ANORO ELLIPTA) 62.5-25 MCG/INH oral inhaler     No current facility-administered medications for this visit.      Past Medical History:   Patient Active Problem List   Diagnosis     Allergic rhinitis     Obesity     Severe persistent asthma beginning 9/2016 on continuous steroid treatment via Kenalog injections.      Constipation     Generalized abdominal pain     Eosinophilic esophagitis     Tic disorder     Eczema     Irritable bowel syndrome     Severe persistent asthma without complication in pediatric patient     Flexural eczema     Class 3 obesity due to excess calories with body mass index (BMI) of 45.0 to 49.9 in adult, unspecified whether serious comorbidity present     Morbid obesity (H)     Past Medical History:   Diagnosis Date     Abdominal pain      Asthma      Asthma      Constipation      Eczema      Environmental allergies      Eosinophilic esophagitis      Obese      Seizures (H)     seizure-like movements       CC Carmine Watson MD  [address] [place] [address] [place] [address] on close of this encounter.

## 2023-07-01 ENCOUNTER — HOSPITAL ENCOUNTER (EMERGENCY)
Facility: CLINIC | Age: 23
Discharge: HOME | End: 2023-07-01
Attending: EMERGENCY MEDICINE | Admitting: EMERGENCY MEDICINE
Payer: COMMERCIAL

## 2023-07-01 VITALS
OXYGEN SATURATION: 98 % | DIASTOLIC BLOOD PRESSURE: 88 MMHG | TEMPERATURE: 98.3 F | SYSTOLIC BLOOD PRESSURE: 128 MMHG | RESPIRATION RATE: 20 BRPM | HEART RATE: 68 BPM

## 2023-07-01 DIAGNOSIS — F10.921 ALCOHOL INTOXICATION WITH DELIRIUM (H): ICD-10-CM

## 2023-07-01 LAB — ETHANOL SERPL-MCNC: 0.29 G/DL

## 2023-07-01 PROCEDURE — 36415 COLL VENOUS BLD VENIPUNCTURE: CPT | Performed by: EMERGENCY MEDICINE

## 2023-07-01 PROCEDURE — 99283 EMERGENCY DEPT VISIT LOW MDM: CPT

## 2023-07-01 PROCEDURE — 82077 ASSAY SPEC XCP UR&BREATH IA: CPT | Performed by: EMERGENCY MEDICINE

## 2023-07-01 ASSESSMENT — ACTIVITIES OF DAILY LIVING (ADL)
ADLS_ACUITY_SCORE: 37
ADLS_ACUITY_SCORE: 37

## 2023-07-01 NOTE — ED TRIAGE NOTES
Patient presents to ED accompanied by friends. Per friend report patient was drinking alcohol unsure of amount.  Slurred speech
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
## 2023-07-27 ENCOUNTER — OFFICE VISIT (OUTPATIENT)
Dept: FAMILY MEDICINE | Facility: CLINIC | Age: 23
End: 2023-07-27
Payer: COMMERCIAL

## 2023-07-27 DIAGNOSIS — L72.0 EPIDERMAL CYST: ICD-10-CM

## 2023-07-27 PROCEDURE — 88305 TISSUE EXAM BY PATHOLOGIST: CPT | Performed by: DERMATOLOGY

## 2023-07-27 PROCEDURE — 11403 EXC TR-EXT B9+MARG 2.1-3CM: CPT | Performed by: PHYSICIAN ASSISTANT

## 2023-07-27 PROCEDURE — 12032 INTMD RPR S/A/T/EXT 2.6-7.5: CPT | Performed by: PHYSICIAN ASSISTANT

## 2023-07-27 NOTE — PROGRESS NOTES
DERMATOLOGY EXCISION PROCEDURE NOTE    Dermatology Problem List:  EIC - mid lower abdomen -excision 7/27/23    NAME OF PROCEDURE: Excision intermediate layered linear closure  Staff surgeon: Geeta Kunz PA-C  Scrub Nurse: Carmelita Mart RN    PRE-OPERATIVE DIAGNOSIS:  cyst  POST-OPERATIVE DIAGNOSIS: Same   FINAL EXCISION SIZE(DEFECT SIZE): 3 cm, with 0 mm margin   FINAL REPAIR LENGTH: 5.2 cm     INDICATIONS: This patient presented with a 3cm cyst of the mid lower abdomen. Excision was indicated. We discussed the principles of treatment and most likely complications including scarring, bleeding, infection, incomplete excision, wound dehiscence, pain, nerve damage, and recurrence. Informed consent was obtained and the patient underwent the procedure as follows:    PROCEDURE: The patient was taken to the operative suite. Time-out was performed.  The treatment area was anesthetized with 1% lidocaine with epinephrine. The area was prepped with Chlorhexidine and rinsed with sterile saline and draped with sterile towels. The lesion was delineated and excised down to subcutaneous fat in a elliptical manner. Hemostasis was obtained by electrocoagulation.     REPAIR: An intermediate layered linear closure was selected as the procedure which would maximally preserve both function and cosmesis.    After the excision of the tumor, the area was carefully  undermined. Hemostasis was obtained with electrocoagulation.  Closure was oriented so that the wound was in the patient's natural skin tension lines. The subcutaneous and dermal layers were then closed with 4-0 monocryl sutures. The epidermis was then carefully approximated along the length of the wound using 4-0prolene simple running sutures.     The final wound length was 5.2 cm. A total of 5 ml of anesthesia was administered for all surgical sites. Estimated blood loss was less than 10 ml for all surgical sites. A sterile pressure dressing was applied and wound care  instructions, with a written handout, were given. [facility] and ambulatory.    Follow-up in 2 weeks for suture removal.     Anatomic Pathology Results: pending    Clinical Follow-Up: prn    Staff Involved:  Staff Only  All risks, benefits and alternatives were discussed with patient.  Patient is in agreement and understands the assessment and plan.  All questions were answered.    Geeta Kunz PA-C, MPAS  [facility] [place]: Phone: [phone], Fax: [phone]  [facility]: Phone: [phone],  Fax: [phone]  [facility]: Phone: [phone], Fax: [phone]

## 2023-07-27 NOTE — PATIENT INSTRUCTIONS
Excision/Mohs Wound Care Instructions  I will experience scar, altered skin color, bleeding, swelling, pain, crusting and redness. I may experience altered sensation. Risks are excessive bleeding, infection, muscle weakness, thick (hypertrophic or keloidal) scar, and recurrence. A second procedure may be recommended to obtain the best cosmetic or functional result.  Possible complications of any surgical procedure are bleeding, infection, scarring, alteration in skin color and sensation, muscle weakness in the area, wound dehiscence or seperation, or recurrence of the lesion or disease. On occasion, after healing, a secondary procedure or revision may be recommended in order to obtain the best cosmetic or functional result.   After your surgery, a pressure bandage will be placed over the area that has sutures. This will help prevent bleeding. Please follow these instructions until you come back to clinic for suture removal in 14 days, as they will help you to prevent complications as your wound heals.  For the First 48 hours After Surgery:  Leave the pressure bandage on and keep it dry. If it should come loose, you may retape it, but do not take it off.  Relax and take it easy. Do not do any vigorous exercise, heavy lifting, or bending forward. This could cause the wound to bleed.  Post-operative pain is usually mild. You may alternate between 1000 mg of Tylenol (acetaminophen) and 400 mg of Ibuprofen every 4 hours.  Do not take more than 4,000mg of acetaminophen in a 24 hour period or 3200 mg of Ibuprofen in a 24 hr period.  Avoid alcohol and vitamin E as these may increase your tendency to bleed.  You may put an ice pack around the bandaged area for 20 minutes every 2-3 hours. This may help reduce swelling, bruising, and pain. Make sure the ice pack is waterproof so that the pressure bandage does not get wet.   You may see a small amount of drainage or blood on your pressure bandage. This is normal. However, if  drainage or bleeding continues or saturates the bandage, you will need to apply firm pressure over the bandage with a washcloth for 15 minutes. If bleeding continues after applying pressure for 15 minutes then go to the nearest emergency room.  48 Hours After Surgery  Carefully remove the bandage and start daily wound care and dressing changes. You may also now shower and get the wound wet.  Daily Wound Care:  Wash wound with a mild soap and water.  Use caution when washing the wound, be gentle and do not let the forceful shower stream hit the wound directly.  Pat dry.  Apply Vaseline (from a new container or tube) over the suture line with a Q-tip. It is very important to keep the wound continuously moist, as wounds heal best in a moist environment.  Keep the site covered until sutures have either been removed or dissolved.  You can cover it with a Telfa (non-stick) dressing and tape or a band-aid.    If you are unable to keep wound covered, you must apply Vaseline every 2-3 hours (while awake) to ensure it is being kept moist for optimal healing. A dressing overnight is recommended to keep the area moist.  Call Us If:  You have pain that is not controlled with Tylenol/Ibuprofen  You have signs or symptoms of an infection, such as: fever over 100 degrees F, redness, warmth, or foul-smelling or yellow drainage from the wound.  Who should I call with questions?  [facility]: [phone]   [facility]: [phone]  For urgent needs outside of business hours call the [facility] at [phone] and ask to speak with the dermatology resident on call

## 2023-07-27 NOTE — LETTER
7/27/2023         RE: Lázaro Colindres  [address] [id number] [id number]  [place] [address]        Dear Colleague,    Thank you for referring your patient, Lázaro Colindres, to the [facility]. Please see a copy of my visit note below.    DERMATOLOGY EXCISION PROCEDURE NOTE    Dermatology Problem List:  EIC - mid lower abdomen -excision 7/27/23    NAME OF PROCEDURE: Excision intermediate layered linear closure  Staff surgeon: Geeta Kunz PA-C  Scrub Nurse: Carmelita Mart RN    PRE-OPERATIVE DIAGNOSIS:  cyst  POST-OPERATIVE DIAGNOSIS: Same   FINAL EXCISION SIZE(DEFECT SIZE): 3 cm, with 0 mm margin   FINAL REPAIR LENGTH: 5.2 cm     INDICATIONS: This patient presented with a 3cm cyst of the mid lower abdomen. Excision was indicated. We discussed the principles of treatment and most likely complications including scarring, bleeding, infection, incomplete excision, wound dehiscence, pain, nerve damage, and recurrence. Informed consent was obtained and the patient underwent the procedure as follows:    PROCEDURE: The patient was taken to the operative suite. Time-out was performed.  The treatment area was anesthetized with 1% lidocaine with epinephrine. The area was prepped with Chlorhexidine and rinsed with sterile saline and draped with sterile towels. The lesion was delineated and excised down to subcutaneous fat in a elliptical manner. Hemostasis was obtained by electrocoagulation.     REPAIR: An intermediate layered linear closure was selected as the procedure which would maximally preserve both function and cosmesis.    After the excision of the tumor, the area was carefully  undermined. Hemostasis was obtained with electrocoagulation.  Closure was oriented so that the wound was in the patient's natural skin tension lines. The subcutaneous and dermal layers were then closed with 4-0 monocryl sutures. The epidermis was then carefully approximated along the length of the wound using  4-0prolene simple running sutures.     The final wound length was 5.2 cm. A total of 5 ml of anesthesia was administered for all surgical sites. Estimated blood loss was less than 10 ml for all surgical sites. A sterile pressure dressing was applied and wound care instructions, with a written handout, were given. The patient was [facility] alert and ambulatory.    Follow-up in 2 weeks for suture removal.     Anatomic Pathology Results: pending    Clinical Follow-Up: prn    Staff Involved:  Staff Only  All risks, benefits and alternatives were discussed with patient.  Patient is in agreement and understands the assessment and plan.  All questions were answered.    Geeta Kunz PA-C, [facility]  [facility] [place]: Phone: [phone], Fax: [phone]  [facility]: Phone: [phone],  Fax: [phone]  [facility]: Phone: [phone], Fax: [phone]                      Again, thank you for allowing me to participate in the care of your patient.        Sincerely,        Geeta Kunz PA-C

## 2023-07-31 LAB
PATH REPORT.COMMENTS IMP SPEC: NORMAL
PATH REPORT.COMMENTS IMP SPEC: NORMAL
PATH REPORT.FINAL DX SPEC: NORMAL
PATH REPORT.GROSS SPEC: NORMAL
PATH REPORT.MICROSCOPIC SPEC OTHER STN: NORMAL
PATH REPORT.RELEVANT HX SPEC: NORMAL

## 2024-06-22 ENCOUNTER — HEALTH MAINTENANCE LETTER (OUTPATIENT)
Age: 24
End: 2024-06-22

## 2025-07-12 ENCOUNTER — HEALTH MAINTENANCE LETTER (OUTPATIENT)
Age: 25
End: 2025-07-12